# Patient Record
Sex: MALE
[De-identification: names, ages, dates, MRNs, and addresses within clinical notes are randomized per-mention and may not be internally consistent; named-entity substitution may affect disease eponyms.]

---

## 2022-07-12 ENCOUNTER — HOSPITAL ENCOUNTER (INPATIENT)
Dept: HOSPITAL 97 - ER | Age: 52
LOS: 1 days | Discharge: HOME | DRG: 282 | End: 2022-07-13
Attending: HOSPITALIST | Admitting: HOSPITALIST
Payer: SELF-PAY

## 2022-07-12 VITALS — BODY MASS INDEX: 38.3 KG/M2

## 2022-07-12 DIAGNOSIS — Z20.822: ICD-10-CM

## 2022-07-12 DIAGNOSIS — I25.2: ICD-10-CM

## 2022-07-12 DIAGNOSIS — Z87.891: ICD-10-CM

## 2022-07-12 DIAGNOSIS — Z95.5: ICD-10-CM

## 2022-07-12 DIAGNOSIS — R07.9: Primary | ICD-10-CM

## 2022-07-12 DIAGNOSIS — I10: ICD-10-CM

## 2022-07-12 DIAGNOSIS — E78.5: ICD-10-CM

## 2022-07-12 DIAGNOSIS — I21.9: ICD-10-CM

## 2022-07-12 DIAGNOSIS — E11.9: ICD-10-CM

## 2022-07-12 DIAGNOSIS — I25.10: ICD-10-CM

## 2022-07-12 PROCEDURE — 80048 BASIC METABOLIC PNL TOTAL CA: CPT

## 2022-07-12 PROCEDURE — 85025 COMPLETE CBC W/AUTO DIFF WBC: CPT

## 2022-07-12 PROCEDURE — 93306 TTE W/DOPPLER COMPLETE: CPT

## 2022-07-12 PROCEDURE — 85610 PROTHROMBIN TIME: CPT

## 2022-07-12 PROCEDURE — 99284 EMERGENCY DEPT VISIT MOD MDM: CPT

## 2022-07-12 PROCEDURE — 80061 LIPID PANEL: CPT

## 2022-07-12 PROCEDURE — 71045 X-RAY EXAM CHEST 1 VIEW: CPT

## 2022-07-12 PROCEDURE — 81003 URINALYSIS AUTO W/O SCOPE: CPT

## 2022-07-12 PROCEDURE — 36415 COLL VENOUS BLD VENIPUNCTURE: CPT

## 2022-07-12 PROCEDURE — 93005 ELECTROCARDIOGRAM TRACING: CPT

## 2022-07-12 PROCEDURE — 84484 ASSAY OF TROPONIN QUANT: CPT

## 2022-07-12 PROCEDURE — 82947 ASSAY GLUCOSE BLOOD QUANT: CPT

## 2022-07-12 PROCEDURE — 96372 THER/PROPH/DIAG INJ SC/IM: CPT

## 2022-07-12 PROCEDURE — 83735 ASSAY OF MAGNESIUM: CPT

## 2022-07-12 PROCEDURE — 80076 HEPATIC FUNCTION PANEL: CPT

## 2022-07-12 PROCEDURE — 82553 CREATINE MB FRACTION: CPT

## 2022-07-12 PROCEDURE — 84443 ASSAY THYROID STIM HORMONE: CPT

## 2022-07-12 PROCEDURE — 96375 TX/PRO/DX INJ NEW DRUG ADDON: CPT

## 2022-07-12 PROCEDURE — 96374 THER/PROPH/DIAG INJ IV PUSH: CPT

## 2022-07-12 PROCEDURE — 82550 ASSAY OF CK (CPK): CPT

## 2022-07-12 PROCEDURE — 83880 ASSAY OF NATRIURETIC PEPTIDE: CPT

## 2022-07-13 VITALS — TEMPERATURE: 97.6 F | SYSTOLIC BLOOD PRESSURE: 104 MMHG | DIASTOLIC BLOOD PRESSURE: 66 MMHG

## 2022-07-13 VITALS — OXYGEN SATURATION: 98 %

## 2022-07-13 LAB
ALBUMIN SERPL BCP-MCNC: 3.2 G/DL (ref 3.4–5)
ALP SERPL-CCNC: 102 U/L (ref 45–117)
ALT SERPL W P-5'-P-CCNC: 27 U/L (ref 12–78)
AST SERPL W P-5'-P-CCNC: 20 U/L (ref 15–37)
BUN BLD-MCNC: 11 MG/DL (ref 7–18)
CKMB CREATINE KINASE MB: 1.6 NG/ML (ref 1–3.6)
GLUCOSE SERPLBLD-MCNC: 225 MG/DL (ref 74–106)
HCT VFR BLD CALC: 43.7 % (ref 39.6–49)
HDLC SERPL-MCNC: 30 MG/DL (ref 40–60)
INR BLD: 1
LDLC SERPL CALC-MCNC: 7 MG/DL (ref ?–130)
LYMPHOCYTES # SPEC AUTO: 2.8 K/UL (ref 0.7–4.9)
MAGNESIUM SERPL-MCNC: 1.8 MG/DL (ref 1.8–2.4)
MCV RBC: 89.8 FL (ref 80–100)
NT-PROBNP SERPL-MCNC: 145 PG/ML (ref ?–125)
PMV BLD: 8.9 FL (ref 7.6–11.3)
POTASSIUM SERPL-SCNC: 3.5 MMOL/L (ref 3.5–5.1)
RBC # BLD: 4.87 M/UL (ref 4.33–5.43)
TROPONIN I SERPL HS-MCNC: 1410.3 PG/ML (ref ?–58.9)
TROPONIN I SERPL HS-MCNC: 1421.7 PG/ML (ref ?–58.9)
TSH SERPL DL<=0.05 MIU/L-ACNC: 3.44 UIU/ML (ref 0.36–3.74)

## 2022-07-13 RX ADMIN — HUMAN INSULIN SCH: 100 INJECTION, SOLUTION SUBCUTANEOUS at 07:30

## 2022-07-13 RX ADMIN — HUMAN INSULIN SCH UNIT: 100 INJECTION, SOLUTION SUBCUTANEOUS at 11:30

## 2022-07-13 NOTE — P.DS
Discharge Date: 07/13/22


Disposition: ROUTINE DISCHARGE


Discharge Condition: GOOD


Reason for Admission: NSTEMI


Brief History of Present Illness: 





Patient is a 51-year-old male with hypertension, CAD, type 2 diabetes 

non-insulin-dependent, hyperlipidemia who presented to the ED with complaints of

chest pain that started 1 hour prior to arrival.  Patient had a STEMI on July 5, 2022 and underwent cardiac catheterization with placement of 2 stents and was 

discharged from [facility] on July 7.  He reports that he has been taking his 

medications as prescribed and denies any other episodes of chest pain.  He 

states the chest pain tonight come on suddenly, unprovoked.  EMS gave 324 mg 

aspirin in route.  Upon arrival to the ED, EKG with nonspecific changes.  

Troponin elevated at 1410.  Patient states that his chest pain has subsided with

pain medication.  He was started on therapeutic Lovenox in the ED.  Patient is 

admitted for further evaluation and treatment.


Hospital Course: 





 spoke with Cardiology and patient recently had intervention done in [place].  

Anticipated that troponin was still be elevated although they are downtrending. 

Patient was not aware of his cardiac meds and we have gone over with these with 

him.  He needs to be compliant with his cardiac meds: Poor.  At this time, 

patient is stable for discharge he will need close cardiology follow-up as an 

outpatient.


Vital Signs/Physical Exam: 














Temp Pulse Resp BP Pulse Ox


 


 97.6 F   63   16   104/66   100 


 


 07/13/22 11:52  07/13/22 11:52  07/13/22 11:52  07/13/22 11:52  07/13/22 11:52








General: Alert, In no apparent distress, Oriented x3


Laboratory Data at Discharge: 














WBC  10.6 K/uL (4.3-10.9)   07/12/22  23:35    


 


Hgb  14.9 g/dL (13.6-17.9)   07/12/22  23:35    


 


Hct  43.7 % (39.6-49.0)   07/12/22  23:35    


 


Plt Count  221 K/uL (152-406)   07/12/22  23:35    


 


PT  11.0 SECONDS (9.5-12.5)   07/12/22  23:35    


 


INR  1.00   07/12/22  23:35    


 


Sodium  138 mmol/L (136-145)   07/12/22  23:35    


 


Potassium  3.5 mmol/L (3.5-5.1)   07/12/22  23:35    


 


BUN  11 mg/dL (7-18)   07/12/22  23:35    


 


Creatinine  1.15 mg/dL (0.55-1.3)   07/12/22  23:35    


 


Glucose  225 mg/dL ()  H  07/12/22  23:35    


 


Magnesium  1.8 mg/dL (1.8-2.4)   07/12/22  23:35    


 


Total Bilirubin  0.3 mg/dL (0.2-1.0)   07/12/22  23:35    


 


AST  20 U/L (15-37)   07/12/22  23:35    


 


ALT  27 U/L (12-78)   07/12/22  23:35    


 


Alkaline Phosphatase  102 U/L ()   07/12/22  23:35    


 


Triglycerides  275 mg/dL (<150)  H  07/13/22  04:20    


 


Cholesterol  92 mg/dL (<200)   07/13/22  04:20    


 


HDL Cholesterol  30 mg/dL (40-60)  L  07/13/22  04:20    


 


Cholesterol/HDL Ratio  3.07   07/13/22  04:20    








Home Medications: 








Aspirin [Aspirin EC 81 MG] 81 mg PO DAILY #30 tablet. 07/13/22 


Atorvastatin Calcium [Lipitor] 40 mg PO BEDTIME #30 tab 07/13/22 


Benztropine Mesylate [Cogentin] 0.5 mg PO DAILY 07/13/22 


Clopidogrel Bisulfate [Plavix] 75 mg PO DAILY #30 tablet 07/13/22 


Metoprolol Tartrate [Lopressor] 25 mg PO BID #60 tab 07/13/22 


Sertraline [Zoloft*] 200 mg PO DAILY 07/13/22 





New Medications: 


Aspirin [Aspirin EC 81 MG] 81 mg PO DAILY #30 tablet.


Atorvastatin Calcium [Lipitor] 40 mg PO BEDTIME #30 tab


Metoprolol Tartrate [Lopressor] 25 mg PO BID #60 tab


Clopidogrel Bisulfate [Plavix] 75 mg PO DAILY #30 tablet


Physician Discharge Instructions: 


OK TO DC IV AND DC HOME


FOLLOW-UP WITH PRIMARY CARE PROVIDER IN 1-2 WEEKS


FOLLOW-UP WITH CARDIOLOGY IN 1-2 WEEKS


RETURN TO THE ER IF Symptoms worsen


CALL DR. ELDRIDGE [phone] IF ANY QUESTIONS REGARDING HOSPITAL STAY.


PLEASE CALL THE FLOOR [phone] IF ANY MEDICATION OR NURSING QUESTIONS.





Diet: AHA


Activity: Fall precautions


Followup: 


Unknown,U [Primary Care Provider] - 


Time spent managing pt's care (in minutes): 35

## 2022-07-13 NOTE — EDPHYS
Physician Documentation                                                                           

 [facility]                                                                 

Name: Delroy Stout                                                                               

Age: 51 yrs                                                                                       

Sex: Male                                                                                         

: 1970                                                                                   

MRN: [id number]                                                                                   

Arrival Date: 2022                                                                          

Time: 23:26                                                                                       

Account#: [id number]                                                                            

Bed 25                                                                                            

Private MD:                                                                                       

ED Physician Sulaiman Putnam                                                                      

HPI:                                                                                              

                                                                                             

23:35 This 51 yrs old Male presents to ER via EMS with complaints of Chest Pain.              cp  

23:35 The patient or guardian reports chest pain that is located primarily in the anterior    cp  

      chest wall, left. Onset: 1 hour(s) ago. The pain does not radiate. The chest pain is        

      described as grabbing. Duration: The patient or guardian reports a single episode, that     

      is still ongoing, but improving.                                                            

23:35 Patient reports history of MI on 2022 in which he was admitted to [facility] in [facility] where he had 2 cardiac stents placed.                            

                                                                                                  

Historical:                                                                                       

- Allergies:                                                                                      

23:28 No Known Allergies;                                                                     bb  

- Home Meds:                                                                                      

23:28 amlodipine oral [Active]; aripiprazole oral [Active]; Brilinta oral [Active];           bb  

      Benztropine Mesylate Oral [Active]; sertraline oral [Active]; Hydroxyzine Oral              

      [Active]; Metformin Oral [Active]; clopidogrel oral [Active]; rosuvastatin oral             

      [Active];                                                                                   

- PMHx:                                                                                           

23:28 Hypertensive disorder; Diabetes mellitus; Bipolar disorder; High cholesterol; Coronary  bb  

      atherosclerosis;                                                                            

- PSHx:                                                                                           

23:28 heart stents;                                                                           bb  

                                                                                                  

- Immunization history:: Adult Immunizations up to date.                                          

- Social history:: Smoking status: unknown.                                                       

                                                                                                  

                                                                                                  

ROS:                                                                                              

23:40 Constitutional: Negative for body aches, chills, fever, poor PO intake.                 cp  

23:40 Cardiovascular: Positive for chest pain, Negative for edema, palpitations.                  

23:40 Eyes: Negative for injury, pain, redness, and discharge.                                cp  

                                                                                                  

Exam:                                                                                             

23:33 ECG was reviewed by the Attending Physician.                                            cp  

23:43 Constitutional: The patient appears in no acute distress, alert, awake,                 cp  

      non-diaphoretic, non-toxic, well developed, well nourished, obese, uncomfortable.           

23:43 Head/Face:  Normocephalic, atraumatic.                                                  cp  

23:43 Eyes: Periorbital structures: appear normal, Conjunctiva: normal, no exudate, no            

      injection, Sclera: no appreciated abnormality, Lids and lashes: appear normal,              

      bilaterally.                                                                                

23:43 ENT: External ear(s): are unremarkable, Nose: is normal, Mouth: Lips: moist, Oral           

      mucosa: moist, Posterior pharynx: Airway: no evidence of obstruction, patent.               

23:43 Neck: ROM/movement: is normal, is supple, without pain, no range of motions limitations.    

23:43 Chest/axilla: Inspection: normal, Palpation: is normal, no crepitus, no tenderness.         

23:43 Cardiovascular: Rate: normal, Rhythm: regular, Edema: is not appreciated, JVD: is not       

      appreciated.                                                                                

23:43 Respiratory: the patient does not display signs of respiratory distress,  Respirations: cp  

      normal, no use of accessory muscles, no retractions, labored breathing, is not present,     

      Breath sounds: are clear throughout, no decreased breath sounds, no stridor, no             

      wheezing.                                                                                   

23:43 Abdomen/GI: Inspection: abdomen appears normal, Bowel sounds: active, all quadrants,    cp  

      Palpation: abdomen is soft and non-tender, in all quadrants.                                

23:43 Back: pain, is absent, ROM is normal.                                                       

23:43 Neuro: Orientation: to person, place \T\ time. Mentation: is normal, Cerebellar function:   

      is grossly normal, Motor: moves all fours, strength is normal, Sensation: is normal.        

                                                                                                  

Vital Signs:                                                                                      

23:28  / 66; Pulse 63; Resp 19; Temp 98.1(O); Pulse Ox 98% on R/A; Weight 111.13 kg;    sm5 

      Height 5 ft. 7 in. (170.18 cm); Pain 3/10;                                                  

23:28 Body Mass Index 38.37 (111.13 kg, 170.18 cm)                                            sm5 

                                                                                                  

MDM:                                                                                              

23:34 Patient medically screened.                                                             cp  

                                                                                             

00:35 The patient was not given aspirin in the Emergency Department. Administered by EMS.     cp  

00:35 Data reviewed: vital signs, nurses notes, lab test result(s), EKG, radiologic studies,  cp  

      plain films. Test interpretation: by ED physician or midlevel provider: ECG, plain          

      radiologic studies. Response to treatment: the patient's symptoms have mildly improved      

      after treatment, and as a result, I will admit patient.                                     

                                                                                                  

                                                                                             

23:33 Order name: Basic Metabolic Panel; Complete Time: 00:31                                 cp  

                                                                                             

23:33 Order name: CBC with Diff; Complete Time: 00:19                                         cp  

                                                                                             

23:33 Order name: LFT's; Complete Time: 00:31                                                 cp  

                                                                                             

23:33 Order name: Magnesium; Complete Time: 00:31                                             cp  

                                                                                             

23:33 Order name: NT PRO-BNP; Complete Time: 00:31                                            cp  

                                                                                             

23:33 Order name: PT-INR; Complete Time: 00:19                                                cp  

                                                                                             

23:33 Order name: Troponin HS; Complete Time: 00:31                                           cp  

                                                                                             

00:32 Interpretation: Abnormal: Troponin HS 1410.3.                                           cp  

                                                                                             

23:33 Order name: COVID-19 SARS RT PCR (Document "Date of Onset" if Symptomatic); Complete    cp  

      Time: 01:04                                                                                 

                                                                                             

05:13 Order name: Creatine Phosphokinase; Complete Time: 05:14                                EDMS

                                                                                             

05:13 Order name: CKMB Creatine Kinase MB; Complete Time: 05:14                               EDMS

                                                                                             

05:13 Order name: Troponin High Sensitivity; Complete Time: 05:14                             EDMS

                                                                                             

23:32 Order name: EKG; Complete Time: 23:33                                                     

                                                                                             

23:32 Order name: Cardiac monitoring; Complete Time: 23:36                                      

                                                                                             

23:32 Order name: EKG - Nurse/Tech; Complete Time: 23:36                                        

                                                                                             

23:33 Order name: XRAY Chest (1 view); Complete Time: 13:28                                     

                                                                                             

23:33 Order name: EKG; Complete Time: 23:35                                                     

                                                                                             

05:13 Order name: Lipid Profile; Complete Time: 05:14                                         EDMS

                                                                                             

05:14 Order name: Thyroid Stimulating Hormone; Complete Time: 05:14                           EDMS

                                                                                             

08:22 Order name: Glucose, Ancillary Testing; Complete Time: 13:28                            EDMS

                                                                                             

11:37 Order name: Troponin High Sensitivity; Complete Time: 13:28                             EDMS

                                                                                             

12:19 Order name: Glucose, Ancillary Testing; Complete Time: 13:28                            EDMS

                                                                                             

12:46 Order name: Urinalysis; Complete Time: 13:28                                            EDMS

                                                                                             

23:32 Order name: IV Saline Lock; Complete Time: 23:45                                          

                                                                                             

23:32 Order name: Labs collected and sent; Complete Time: 23:45                               bb  

                                                                                             

23:32 Order name: O2 Per Protocol; Complete Time: 23:36                                       bb  

                                                                                             

23:32 Order name: O2 Sat Monitoring; Complete Time: 23:36                                     bb  

                                                                                             

23:33 Order name: Cardiac monitoring; Complete Time: 23:41                                    cp  

                                                                                             

23:33 Order name: EKG - Nurse/Tech; Complete Time: 23:41                                      cp  

                                                                                             

23:33 Order name: IV Saline Lock; Complete Time: 23:41                                        cp  

                                                                                             

23:33 Order name: Labs collected and sent; Complete Time: 23:41                               cp  

                                                                                             

23:33 Order name: O2 Per Protocol; Complete Time: :41                                       cp  

                                                                                             

23:33 Order name: O2 Sat Monitoring; Complete Time: :41                                     cp  

                                                                                                  

EC/12                                                                                             

23:33 Rate is 60 beats/min. Rhythm is regular. IN interval is normal. QRS interval is normal. cp  

      QT interval is normal. T waves are Inverted in leads II, aVF, aVR, V3, V4, V5, V6.          

      Interpreted by me. Reviewed by me.                                                          

                                                                                                  

Administered Medications:                                                                         

23:40 Drug: fentaNYL (PF) 25 mcg Route: IVP; Site: left hand;                                 [facility] 

                                                                                             

01:29 Follow up: Response: No adverse reaction                                                sm5 

01:01 Drug: Lovenox (enoxaparin) 1 mg/kg Route: Sub-Q; Site: left upper abdomen;              sm5 

01:29 Follow up: Response: No adverse reaction                                                sm5 

01:40 Drug: NS 0.9% 1000 ml Route: IV; Rate: 500 ml/hr; Site: left hand;                      sm5 

01:40 Drug: morphine 2 mg Route: IVP; Infused Over: 4 mins; Site: left hand;                  5 

                                                                                                  

                                                                                                  

Disposition:                                                                                      

06:05 Co-signature as Attending Physician, Sulaiman Putnam MD.                                 mh7 

                                                                                                  

Disposition Summary:                                                                              

22 00:55                                                                                    

Hospitalization Ordered                                                                           

      Hospitalization Status: Inpatient Admission                                             cp  

      Provider: David Romo cp  

      Condition: Stable                                                                       cp  

      Problem: new                                                                            cp  

      Symptoms: have improved                                                                 cp  

      Bed/Room Type: Standard                                                                 cp  

      Location: [facility] ER HOLD(22 02:09)                                                    

      Room Assignment: ERHOLD-(22 02:09)                                                cg  

      Diagnosis                                                                                   

        - Subsequent non-ST elevation (NSTEMI) myocardial infarction                          cp  

      Forms:                                                                                      

        - Medication Reconciliation Form                                                      cp  

        - SBAR form                                                                           cp  

Signatures:                                                                                       

Dispatcher [employer]                           EDMS                                                 

Nissa Palafox, ROCHELLE                     RN   Brayan Morales PA PA cp Garcia, Cindy, RN RN   cg                                                   

Sulaiman Putnam MD MD   mh7                                                  

Kimberly Moran RN                        RN   sm5                                                  

Carmen Smith PA                       PA   sb3                                                  

                                                                                                  

Corrections: (The following items were deleted from the chart)                                    

                                                                                             

23:41 23:33 [facility]

                                                                                             

01:10 07/12 23:33 BASIC METABOLIC PANEL[facility]

                                                                                             

01:10 07/12 23:33 [facility]

                                                                                             

01:10 07/12 23:33 Troponin High Sensitivity+C.LAB.BRZ ordered. [facility]

                                                                                             

02:09 00:55 Telemetry/MedSurg (Inpatient) cp                                                  cg  

02:09 00:55 cp                                                                                cg  

03:05  22:40 Constitutional: Negative for body aches, chills, fever, poor PO intake, cp  cp  

                                                                                             

03:05  22:40 Cardiovascular: Positive for chest pain, Negative for edema, palpitations,  cp  

      cp                                                                                          

                                                                                                  

**************************************************************************************************

## 2022-07-13 NOTE — RAD REPORT
EXAM DESCRIPTION:  RAD - Chest Single View - 7/12/2022 11:49 pm

 

CLINICAL HISTORY:  The patient is 51 years old and is Male; CHEST PAIN

 

TECHNIQUE:  Frontal view of the chest.

 

COMPARISON:  No relevant prior studies available.

 

FINDINGS:  LUNGS:   Unremarkable.   No consolidation.

  PLEURAL SPACE:   Unremarkable.   No pneumothorax.

  HEART:   Unremarkable.   No cardiomegaly.

  MEDIASTINUM:   Unremarkable.

  BONES/JOINTS:   Unremarkable.

  UPPER ABDOMEN:   Unremarkable as visualized.

 

IMPRESSION:  No acute cardiopulmonary process.

 

Electronically signed by:   Shannon Aponte MD   7/13/2022 12:12 AM CDT Workstation: [id number]

 

 

 

Due to temporary technical issues with the PACS/Fluency reporting system, reports are being signed by
 the in house radiologists without review as a courtesy to insure prompt reporting. The interpreting 
radiologist is fully responsible for the content of the report.

## 2022-07-13 NOTE — P.HP
Certification for Inpatient


Patient admitted to: Inpatient


With expected LOS: <2 Midnights


Patient will require the following post-hospital care: None


Practitioner: I am a practitioner with admitting privileges, knowledge of 

patient current condition, hospital course, and medical plan of care.


Services: Services provided to patient in accordance with Admission requirements

found in Title 42 Section 412.3 of the Code of Federal Regulations





<Carmen Smith - Last Filed: 07/13/22 02:52>





Patient History


Date of Service: 07/13/22


Reason for admission: NSTEMI


History of Present Illness: 


Patient is a 51-year-old male with hypertension, CAD, type 2 diabetes 

non-insulin-dependent, hyperlipidemia who presented to the ED with complaints of

chest pain that started 1 hour prior to arrival.  Patient had a STEMI on July 5, 2022 and underwent cardiac catheterization with placement of 2 stents and was 

discharged from [facility] on July 7.  He reports that he has been taking his 

medications as prescribed and denies any other episodes of chest pain.  He 

states the chest pain tonight come on suddenly, unprovoked.  EMS gave 324 mg 

aspirin in route.  Upon arrival to the ED, EKG with nonspecific changes.  

Troponin elevated at 1410.  Patient states that his chest pain has subsided with

pain medication.  He was started on therapeutic Lovenox in the ED.  Patient is 

admitted for further evaluation and treatment.





Home medications list reviewed: Yes





- Past Medical/Surgical History


Diabetic: Yes


-: Type 2 Diabetes, Non-Insulin Dependent


-: CAD


-: STEMI


-: HTN


-: HLD


-: Cardiac stent x 2


Psychosocial/ Personal History: Patient lives in [place].





- Family History


  ** Brother


-: Heart disease





  ** Mother


-: Diabetes





- Social History


Smoking Status: Former smoker


Alcohol use: No


CD- Drugs: No


Caffeine use: Yes


Place of Residence: Home





<LuisCarmen - Last Filed: 07/13/22 02:52>


Date of Service: 07/13/22





<David Romo - Last Filed: 07/16/22 21:11>





Review of Systems


Cardiovascular: Chest Pain





<LuisCarmen - Last Filed: 07/13/22 02:52>





Physical Examination





- Physical Exam


General: Alert, In no apparent distress, Obese


HEENT: Atraumatic, PERRLA, Mucous membr. moist/pink, EOMI, Sclerae nonicteric


Neck: Supple, 2+ carotid pulse no bruit, No LAD, Without JVD or thyroid 

abnormality


Respiratory: Clear to auscultation bilaterally, Normal air movement


Cardiovascular: No edema, Regular rate/rhythm, Normal S1 S2


Gastrointestinal: Normal bowel sounds, No tenderness


Musculoskeletal: No tenderness


Integumentary: No rashes


Neurological: Normal speech, Normal strength at 5/5 x4 extr, Normal tone, Normal

affect





- Studies


Laboratory Data (last 24 hrs)





07/12/22 23:35: PT 11.0, INR 1.00


07/12/22 23:35: WBC 10.6, Hgb 14.9, Hct 43.7, Plt Count 221


07/12/22 23:35: Sodium 138, Potassium 3.5, BUN 11, Creatinine 1.15, Glucose 225 

H, Magnesium 1.8, Total Bilirubin 0.3, AST 20, ALT 27, Alkaline Phosphatase 102


07/12/22 23:32: WBC Cancelled, Hgb Cancelled, Hct Cancelled, Plt Count Cancelled


07/12/22 23:32: Sodium Cancelled, Potassium Cancelled, BUN Cancelled, Creatinine

Cancelled, Glucose Cancelled








<Carmen Smith - Last Filed: 07/13/22 02:52>





Assessment and Plan





- Problems (Diagnosis)


(1) Subsequent non-ST elevation (NSTEMI) myocardial infarction


Status: Acute   





(2) CAD (coronary artery disease)


Status: Chronic   


Qualifiers: 


   Coronary Disease-Associated Artery/Lesion type: native artery   Native vs. 

transplanted heart: native heart   Associated angina: with unstable angina   

Qualified Code(s): I25.110 - Atherosclerotic heart disease of native coronary 

artery with unstable angina pectoris   





(3) Hypertension


Status: Chronic   


Qualifiers: 


   Hypertension type: primary hypertension   Qualified Code(s): I10 - Essential 

(primary) hypertension   





(4) Type 2 diabetes mellitus


Status: Chronic   


Qualifiers: 


   Diabetes mellitus long term insulin use: without long term use   Diabetes 

mellitus complication status: with hyperglycemia   Qualified Code(s): E11.65 - 

Type 2 diabetes mellitus with hyperglycemia   





(5) Hyperlipidemia


Status: Chronic   


Qualifiers: 


   Hyperlipidemia type: unspecified   Qualified Code(s): E78.5 - Hyperlipidemia,

unspecified   





- Plan


-Continue therapeutic lovenox q12 hours


-Cardiology consulted


-Trend troponin and check CPK/CKMB


-Monitor on telemetry


-Echo, lipid panel, TSH ordered


-NPO in case of cardiac catheterization


-Morphine PRN


-ACHS accu checks with mild sliding scale insulin


-Reconcile and continue home medications


-Full code


Discharge Plan: Home


Plan to discharge in: 48 Hours





- Advance Directives


Does patient have a Living Will: No


Does patient have a Durable POA for Healthcare: No





- Code Status/Comfort Care


Code Status Assessed: Yes (Full)


Critical Care: No


Time Spent Managing Pts Care (In Minutes): 50





<Carmen Smith - Last Filed: 07/13/22 02:52>


Date of Service: 07/13/22





  SUBJECTIVE:  


Agree with the HPI as mentioned above





OBJECTIVE:


Vital Signs:  reviewed


General: WNL


HEENT:WNL


CV: WNL


Lungs:  WNL


Abd: WNL


Ext:  WNL





ASSESSMENT:


1.   Chest pain rule out acute coronary syndrome





PLAN:  


Plan as mentioned above








<David Romo - Last Filed: 07/16/22 21:11>

## 2022-07-13 NOTE — EKG
Test Date:    2022-07-12               Test Time:    23:27:25

Technician:   HUMZA                                    

                                                     

MEASUREMENT RESULTS:                                       

Intervals:                                           

Rate:         60                                     

MD:           156                                    

QRSD:         90                                     

QT:           424                                    

QTc:          424                                    

Axis:                                                

P:            14                                     

MD:           156                                    

QRS:          -20                                    

T:            -29                                    

                                                     

INTERPRETIVE STATEMENTS:                                       

                                                     

Normal sinus rhythm

Voltage criteria for left ventricular hypertrophy

Inferior infarct, age undetermined

T wave abnormality, consider anterolateral ischemia

Abnormal ECG

No previous ECG available for comparison



Electronically Signed On 07-13-22 07:48:12 CDT by Bereket Diez

## 2022-07-13 NOTE — ECHO
HEIGHT: 5 ft 7 in   WEIGHT: 245 lb 0 oz   DATE OF STUDY: 7/13/22   REFER DR: Carmen Smith

2-DIMENSIONAL: YES

     M.MODE: YES

 DOPPLER: YES

COLOR FLOW: YES



                    TDS:  NO

PORTABLE: YES

 DEFINITY:  NO

BUBBLE STUDY: NO





DIAGNOSIS:  NSTEMI



CARDIAC HISTORY:  

CATHERIZATION: YES

SURGERY: NO

PROSTHETIC VALVE: NO

PACEMAKER: NO





MEASUREMENTS (cm)

    DIASTOLIC (NORMALS)                 SYSTOLIC (NORMALS)

IVSd                 1.2 (0.6-1.2)                    LA Diam 3.5 (1.9-4.0)     LVEF       
  69%  

LVIDd               4.4 (3.5-5.7)                        LVIDs      2.7 (2.0-3.5)     %FS  
        39%

LVPWd             1.2 (0.6-1.2)

Ao Diam           2.6 (2.0-3.7)



2 DIMENSIONAL ASSESSMENT:

RIGHT ATRIUM:                   NORMAL

LEFT ATRIUM:       NORMAL



RIGHT VENTRICLE:            NORMAL

LEFT VENTRICLE: NORMAL



TRICUSPID VALVE:             NORMAL

MITRAL VALVE:     NORMAL



PULMONIC VALVE:             NORMAL

AORTIC VALVE:     NORMAL



PERICARDIAL EFFUSION: NONE

AORTIC ROOT:      NORMAL





LEFT VENTRICULAR WALL MOTION:     NORMAL.



DOPPLER/COLOR FLOW:     NORMAL.



COMMENTS:      NORMAL 2D ECHO WITH DOPPLER. NO WALL RITESH ABNORMALITY. NO EFFUSION.



TECHNOLOGIST:   JAEL CARLSON

## 2022-07-13 NOTE — ER
Nurse's Notes                                                                                     

 [facility]                                                                 

Name: Delroy Stout                                                                               

Age: 51 yrs                                                                                       

Sex: Male                                                                                         

: 1970                                                                                   

MRN: [id number]                                                                                   

Arrival Date: 2022                                                                          

Time: 23:26                                                                                       

Account#: [id number]                                                                            

Bed 25                                                                                            

Private MD:                                                                                       

Diagnosis: Subsequent non-ST elevation (NSTEMI) myocardial infarction                             

                                                                                                  

Presentation:                                                                                     

                                                                                             

23:27 Chief complaint: EMS states: they were toned out for report of pt with chest pain x 1   bb  

      hour pt had STEMI 2022 seen at [facility] and had 2 stents placed. Coronavirus       

      screen: At this time, the client does not indicate any symptoms associated with             

      coronavirus-19. Ebola Screen: No symptoms or risks identified at this time. Initial         

      Sepsis Screen: Does the patient meet any 2 criteria? No. Patient's initial sepsis           

      screen is negative. Does the patient have a suspected source of infection? No.              

      Patient's initial sepsis screen is negative. Risk Assessment: Do you want to hurt           

      yourself or someone else? Patient reports no desire to harm self or others. Onset of        

      symptoms was 2022.                                                                 

23:27 Method Of Arrival: EMS: [facility]                                                       bb  

23:27 Acuity: ARIAN 3                                                                           bb  

23:32 Care prior to arrival: Medication(s) given: ASA, 81 mg, x 4, IV initiated. 20 GA, in    bb  

      the left hand.                                                                              

                                                                                                  

Triage Assessment:                                                                                

23:46 General: Appears in no apparent distress. Behavior is cooperative. Pain: Complains of   sm5 

      pain in chest. Neuro: Level of Consciousness is awake, alert, obeys commands, Oriented      

      to person, place, time, situation. Cardiovascular: Reports chest pain, Capillary refill     

      < 3 seconds Patient's skin is warm and dry. Respiratory: Airway is patent Trachea           

      midline Respiratory effort is even, unlabored.                                              

                                                                                                  

Historical:                                                                                       

- Allergies:                                                                                      

23:28 No Known Allergies;                                                                     bb  

- Home Meds:                                                                                      

23:28 amlodipine oral [Active]; aripiprazole oral [Active]; Brilinta oral [Active];           bb  

      Benztropine Mesylate Oral [Active]; sertraline oral [Active]; Hydroxyzine Oral              

      [Active]; Metformin Oral [Active]; clopidogrel oral [Active]; rosuvastatin oral             

      [Active];                                                                                   

- PMHx:                                                                                           

23:28 Hypertensive disorder; Diabetes mellitus; Bipolar disorder; High cholesterol; Coronary  bb  

      atherosclerosis;                                                                            

- PSHx:                                                                                           

23:28 heart stents;                                                                           bb  

                                                                                                  

- Immunization history:: Adult Immunizations up to date.                                          

- Social history:: Smoking status: unknown.                                                       

                                                                                                  

                                                                                                  

Screenin:45 Abuse screen: Denies threats or abuse. Denies injuries from another. Nutritional        [facility] 

      screening: No deficits noted. Tuberculosis screening: No symptoms or risk factors           

      identified. Fall Risk None identified.                                                      

                                                                                                  

Assessment:                                                                                       

                                                                                             

00:00 Reassessment: see triage assessment.                                                    [facility] 

01:29 Reassessment: No changes from previously documented assessment. Patient and/or family   [facility] 

      updated on plan of care and expected duration. Pain level reassessed.                       

                                                                                                  

Vital Signs:                                                                                      

                                                                                             

23:28  / 66; Pulse 63; Resp 19; Temp 98.1(O); Pulse Ox 98% on R/A; Weight 111.13 kg;    5 

      Height 5 ft. 7 in. (170.18 cm); Pain 3/10;                                                  

23:28 Body Mass Index 38.37 (111.13 kg, 170.18 cm)                                            [facility] 

                                                                                                  

ED Course:                                                                                        

23:26 Patient arrived in ED.                                                                  mw2 

23:27 Brayan Jules PA is PHCP.                                                                cp  

23:27 Sulaiman Putnam MD is Attending Physician.                                             cp  

23:28 Triage completed.                                                                       bb  

23:28 Kimberly Moran RN is Primary Nurse.                                                      [facility] 

23:28 Arm band placed on Patient placed in an exam room, on a stretcher, on cardiac monitor,  bb  

      on pulse oximetry. EKG completed in triage. Results shown to MD.                            

23:41 COVID-19 SARS RT PCR (Document "Date of Onset" if Symptomatic) Sent.                    [facility] 

23:45 Maintain EMS IV. Dressing intact. Good blood return noted. Site clean \T\ dry. Gauge \T\    
5

      site: 20G L hand.                                                                           

23:46 Patient has correct armband on for positive identification. Bed in low position. Call   [facility] 

      light in reach. Side rails up X2.                                                           

23:51 XRAY Chest (1 view) In Process Unspecified.                                             EDMS

                                                                                             

00:55 David Romo MD is Hospitalizing Provider.                                           cp  

                                                                                                  

Administered Medications:                                                                         

                                                                                             

23:40 Drug: fentaNYL (PF) 25 mcg Route: IVP; Site: left hand;                                 [facility] 

                                                                                             

01:29 Follow up: Response: No adverse reaction                                                sm5 

01:01 Drug: Lovenox (enoxaparin) 1 mg/kg Route: Sub-Q; Site: left upper abdomen;              5 

01:29 Follow up: Response: No adverse reaction                                                5 

01:40 Drug: NS 0.9% 1000 ml Route: IV; Rate: 500 ml/hr; Site: left hand;                      5 

01:40 Drug: morphine 2 mg Route: IVP; Infused Over: 4 mins; Site: left hand;                  [facility] 

                                                                                                  

                                                                                                  

Outcome:                                                                                          

00:55 Decision to Hospitalize by Provider.                                                    cp  

16:05 Patient left the ED.                                                                    lindsey  

                                                                                                  

Signatures:                                                                                       

Dispatcher [employer]                           EDMS                                                 

Nissa Palafox, RN                     RN   bb                                                   

Brayan Jules PA PA cp [place], MyKena                            2                                                  

Kimberly Moran RN                        RN   5                                                  

Geraldine Wood                                                   

                                                                                                  

Corrections: (The following items were deleted from the chart)                                    

01:10 07/12 23:41 BASIC METABOLIC PANEL+C.LAB.BRZ drawn and sent. 5                         EDMS

                                                                                             

01:10 07/12 23:41 CBC+H.LAB.BRZ drawn and sent. [address]

                                                                                             

01:10 07/12 23:41 Troponin High Sensitivity+C.LAB.BRZ drawn and sent. [address]

                                                                                                  

**************************************************************************************************

## 2022-07-16 NOTE — CON
Date of Consultation:  07/13/2022



The patient admitted to Dr. Romo on 07/13/2022.  I saw the patient on 07/13/2022.



Reason For Consultation:  Chest pain and elevated troponin.



History Of Present Illness:  Mr. Stout is a 51-year-old male.  Has a history of hypertension, diabet
es, coronary artery disease, dyslipidemia.  Came in with pain that has been going on for about an maximiliano
r.  Less than a week ago, he had an MI and ended up with 2 stents at [facility].  His EKG when he came
 in did not reveal any acute changes.  He did not have any shortness of breath, nausea, vomiting, rafat
phoresis, PND, orthopnea, pedal edema, palpitations, or syncope.  He stated this pain is different th
an what was when he had a heart attack.  His troponin came back elevated at 1410.



Past Medical History:  As stated above.



Allergies:  NONE.



Review of Systems:

Negative.



Social History:  Negative.



Family History:  Positive for heart disease in his brother, diabetes in his mother.



Physical Examination:

Vital Signs:  Stable.  He was afebrile. 

HEENT:  Negative. 

Neck:  Supple with no bruit. 

Chest:  Clear to auscultation and percussion. 

Cardiac:  Revealed a regular rhythm and rate.  No murmurs, gallops, or rubs. 

Abdomen:  Benign. 

Extremities:  Revealed no clubbing, cyanosis, or edema.



Medications:  At home include aspirin, Lipitor, Plavix, metoprolol, and Zoloft as well as Cogentin.



Diagnostic Data:  His EKG showed left ventricular hypertrophy.  Chest x-ray was negative.  Echocardio
gram which was done before I saw the patient was normal with ejection fraction of 69%.  No wall motio
n abnormalities and no effusion.



Impression And Plan:  Chest pain possibly secondary to Plavix or maybe stretching of his myocardium b
y the previous stent.  He only had the stent less than a week ago and those stents were occluded with
 thrombus causing severe ST elevation which he does not have.  I think the troponin is a remnant of h
is recent MI.  His echocardiogram is normal.  His EKGs did not show any acute changes.  His chest x-r
ay is negative.  I am comfortable with him going home and following up with his interventional cardio
logist in [facility] in the near future.  Continue present regimen.  May be a good idea to give him so
me nitroglycerin to take on an as-needed basis.  I noticed on his medicine, he is taking Lipitor 40 m
g and that probably should be bumped up to 80 mg.





NB/TEREZA

DD:  07/16/2022 10:05:58Voice ID:  [id number]

DT:  07/16/2022 10:42:27Report ID:  [id number]